# Patient Record
Sex: FEMALE | Race: BLACK OR AFRICAN AMERICAN | NOT HISPANIC OR LATINO | Employment: OTHER | URBAN - METROPOLITAN AREA
[De-identification: names, ages, dates, MRNs, and addresses within clinical notes are randomized per-mention and may not be internally consistent; named-entity substitution may affect disease eponyms.]

---

## 2023-11-08 ENCOUNTER — APPOINTMENT (OUTPATIENT)
Dept: OPHTHALMOLOGY | Facility: CLINIC | Age: 83
End: 2023-11-08
Payer: MEDICARE

## 2024-01-02 ENCOUNTER — OFFICE VISIT (OUTPATIENT)
Dept: OPHTHALMOLOGY | Facility: CLINIC | Age: 84
End: 2024-01-02
Payer: MEDICARE

## 2024-01-02 DIAGNOSIS — H04.123 DRY EYES: ICD-10-CM

## 2024-01-02 DIAGNOSIS — H40.1131 PRIMARY OPEN ANGLE GLAUCOMA (POAG) OF BOTH EYES, MILD STAGE: ICD-10-CM

## 2024-01-02 DIAGNOSIS — Z96.1 PSEUDOPHAKIA: ICD-10-CM

## 2024-01-02 DIAGNOSIS — H40.1131 PRIMARY OPEN ANGLE GLAUCOMA (POAG) OF BOTH EYES, MILD STAGE: Primary | ICD-10-CM

## 2024-01-02 PROCEDURE — 92012 INTRM OPH EXAM EST PATIENT: CPT | Performed by: OPHTHALMOLOGY

## 2024-01-02 RX ORDER — LATANOPROST 50 UG/ML
SOLUTION/ DROPS OPHTHALMIC
Qty: 5 ML | OUTPATIENT
Start: 2024-01-02

## 2024-01-02 RX ORDER — LATANOPROST 50 UG/ML
1 SOLUTION/ DROPS OPHTHALMIC NIGHTLY
Qty: 2.5 ML | Refills: 3 | Status: SHIPPED | OUTPATIENT
Start: 2024-01-02 | End: 2024-01-03

## 2024-01-02 RX ORDER — LATANOPROST 50 UG/ML
1 SOLUTION/ DROPS OPHTHALMIC NIGHTLY
Qty: 2.5 ML | Refills: 3 | Status: SHIPPED | OUTPATIENT
Start: 2024-01-02 | End: 2024-01-02 | Stop reason: SDUPTHER

## 2024-01-02 ASSESSMENT — EXTERNAL EXAM - LEFT EYE: OS_EXAM: NORMAL

## 2024-01-02 ASSESSMENT — TONOMETRY
IOP_METHOD: GOLDMANN APPLANATION
OS_IOP_MMHG: 13
OD_IOP_MMHG: 13

## 2024-01-02 ASSESSMENT — SLIT LAMP EXAM - LIDS
COMMENTS: GOOD POSITION
COMMENTS: GOOD POSITION

## 2024-01-02 ASSESSMENT — EXTERNAL EXAM - RIGHT EYE: OD_EXAM: NORMAL

## 2024-01-02 ASSESSMENT — PACHYMETRY
OD_CT(UM): 543
OS_CT(UM): 537

## 2024-01-02 ASSESSMENT — VISUAL ACUITY
OD_CC: 20/25
OS_CC: 20/20
METHOD: SNELLEN - LINEAR

## 2024-01-02 NOTE — PROGRESS NOTES
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, mild stage  -continue with Latanoprost both eyes at bedtime    Dry eyes  -Start artificial tears both eyes (OU) qid  -stress compliance  -start warm compresses both eyes bid    Pseudophakia  continue to monitor      Return in   3 month(s) for follow up or sooner if having any problems

## 2024-04-02 ENCOUNTER — OFFICE VISIT (OUTPATIENT)
Dept: OPHTHALMOLOGY | Facility: CLINIC | Age: 84
End: 2024-04-02
Payer: MEDICARE

## 2024-04-02 DIAGNOSIS — Z96.1 PSEUDOPHAKIA: ICD-10-CM

## 2024-04-02 DIAGNOSIS — H40.1131 PRIMARY OPEN ANGLE GLAUCOMA (POAG) OF BOTH EYES, MILD STAGE: Primary | ICD-10-CM

## 2024-04-02 DIAGNOSIS — H26.491 POSTERIOR CAPSULAR OPACIFICATION OF RIGHT EYE, NOT OBSCURING VISION: ICD-10-CM

## 2024-04-02 PROCEDURE — 92012 INTRM OPH EXAM EST PATIENT: CPT | Performed by: OPHTHALMOLOGY

## 2024-04-02 RX ORDER — LATANOPROST 50 UG/ML
SOLUTION/ DROPS OPHTHALMIC
Qty: 7.5 ML | Refills: 0 | Status: SHIPPED | OUTPATIENT
Start: 2024-04-02

## 2024-04-02 ASSESSMENT — TONOMETRY
IOP_METHOD: GOLDMANN APPLANATION
OS_IOP_MMHG: 13
OD_IOP_MMHG: 12

## 2024-04-02 ASSESSMENT — VISUAL ACUITY
OD_CC: 20/25-
OS_CC: 20/20
METHOD: SNELLEN - LINEAR

## 2024-04-02 ASSESSMENT — SLIT LAMP EXAM - LIDS
COMMENTS: GOOD POSITION
COMMENTS: GOOD POSITION

## 2024-04-02 ASSESSMENT — EXTERNAL EXAM - RIGHT EYE: OD_EXAM: NORMAL

## 2024-04-02 ASSESSMENT — EXTERNAL EXAM - LEFT EYE: OS_EXAM: NORMAL

## 2024-04-02 ASSESSMENT — PACHYMETRY
OD_CT(UM): 543
OS_CT(UM): 537

## 2024-04-02 NOTE — PROGRESS NOTES
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, mild stage  -     latanoprost (Xalatan) 0.005 % ophthalmic solution; INSTILL 1 DROP IN BOTH EYES EVERY DAY AT BEDTIME  Stable intraocular pressure (IOP)  -continue with Latanoprost both eyes at bedtime    Pseudophakia  Posterior capsular opacification of right eye, not obscuring vision    Return for a dilated exam in   3 months or sooner if having any problems

## 2024-07-09 ENCOUNTER — APPOINTMENT (OUTPATIENT)
Dept: OPHTHALMOLOGY | Facility: CLINIC | Age: 84
End: 2024-07-09
Payer: MEDICARE

## 2024-07-09 DIAGNOSIS — H43.399 VITREOUS OPACITIES: ICD-10-CM

## 2024-07-09 DIAGNOSIS — H26.491 POSTERIOR CAPSULAR OPACIFICATION OF RIGHT EYE, OBSCURING VISION: ICD-10-CM

## 2024-07-09 DIAGNOSIS — Z96.1 PSEUDOPHAKIA: ICD-10-CM

## 2024-07-09 DIAGNOSIS — H04.123 DRY EYES: ICD-10-CM

## 2024-07-09 DIAGNOSIS — H53.8 BLURRED VISION: ICD-10-CM

## 2024-07-09 DIAGNOSIS — H40.1131 PRIMARY OPEN ANGLE GLAUCOMA (POAG) OF BOTH EYES, MILD STAGE: Primary | ICD-10-CM

## 2024-07-09 PROCEDURE — 92014 COMPRE OPH EXAM EST PT 1/>: CPT | Performed by: OPHTHALMOLOGY

## 2024-07-09 RX ORDER — LATANOPROST 50 UG/ML
1 SOLUTION/ DROPS OPHTHALMIC NIGHTLY
Qty: 7.5 ML | Refills: 3 | Status: SHIPPED | OUTPATIENT
Start: 2024-07-09 | End: 2024-10-07

## 2024-07-09 ASSESSMENT — VISUAL ACUITY
METHOD: SNELLEN - LINEAR
OS_CC: 20/20
OD_CC: 20/30

## 2024-07-09 ASSESSMENT — TONOMETRY
OD_IOP_MMHG: 13
OS_IOP_MMHG: 12
IOP_METHOD: GOLDMANN APPLANATION

## 2024-07-09 ASSESSMENT — SLIT LAMP EXAM - LIDS
COMMENTS: GOOD POSITION
COMMENTS: GOOD POSITION

## 2024-07-09 ASSESSMENT — PACHYMETRY
OS_CT(UM): 537
OD_CT(UM): 543

## 2024-07-09 ASSESSMENT — CUP TO DISC RATIO
OS_RATIO: 0.55
OD_RATIO: 0.55

## 2024-07-09 ASSESSMENT — EXTERNAL EXAM - LEFT EYE: OS_EXAM: NORMAL

## 2024-07-09 ASSESSMENT — EXTERNAL EXAM - RIGHT EYE: OD_EXAM: NORMAL

## 2024-07-09 NOTE — PROGRESS NOTES
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, mild stage  -     latanoprost (Xalatan) 0.005 % ophthalmic solution; Administer 1 drop into both eyes once daily at bedtime. INSTILL 1 DROP IN BOTH EYES EVERY DAY AT BEDTIME  Stable intraocular pressure (IOP)  continue to monitor  -continue with Latanoprost both eyes at bedtime    Dry eyes  -Start artificial tears both eyes (OU) qid  -stress compliance    Pseudophakia  continue to monitor    Posterior capsular opacification of right eye, obscuring vision  Visually significant  Refer to Dr. Ambrosio for YAG capsulotomy    Blurred vision right ey e  -due to opacified post. Capsule     Vitreous opacities  continue to monitor    Return in  3 month(s) for follow up or sooner if having any problems  Refract at next visit

## 2024-09-17 ENCOUNTER — APPOINTMENT (OUTPATIENT)
Dept: OPHTHALMOLOGY | Facility: CLINIC | Age: 84
End: 2024-09-17
Payer: MEDICARE

## 2024-09-17 DIAGNOSIS — H26.491 PCO (POSTERIOR CAPSULAR OPACIFICATION), RIGHT: Primary | ICD-10-CM

## 2024-09-17 DIAGNOSIS — H52.203 ASTIGMATISM OF BOTH EYES, UNSPECIFIED TYPE: ICD-10-CM

## 2024-09-17 DIAGNOSIS — H52.12 MYOPIA OF LEFT EYE: ICD-10-CM

## 2024-09-17 DIAGNOSIS — H52.4 PRESBYOPIA: ICD-10-CM

## 2024-09-17 DIAGNOSIS — Z96.1 PSEUDOPHAKIA: ICD-10-CM

## 2024-09-17 DIAGNOSIS — H43.813 PVD (POSTERIOR VITREOUS DETACHMENT), BOTH EYES: ICD-10-CM

## 2024-09-17 DIAGNOSIS — H40.1131 PRIMARY OPEN ANGLE GLAUCOMA (POAG) OF BOTH EYES, MILD STAGE: ICD-10-CM

## 2024-09-17 DIAGNOSIS — H52.01 HYPEROPIA OF RIGHT EYE: ICD-10-CM

## 2024-09-17 PROCEDURE — 66821 AFTER CATARACT LASER SURGERY: CPT | Mod: RIGHT SIDE | Performed by: OPHTHALMOLOGY

## 2024-09-17 PROCEDURE — 99204 OFFICE O/P NEW MOD 45 MIN: CPT | Performed by: OPHTHALMOLOGY

## 2024-09-17 RX ORDER — IRBESARTAN 300 MG/1
1 TABLET ORAL
COMMUNITY
Start: 2024-07-20

## 2024-09-17 RX ORDER — ATENOLOL 50 MG/1
1 TABLET ORAL
COMMUNITY
Start: 2024-08-07

## 2024-09-17 RX ORDER — AMLODIPINE BESYLATE 10 MG/1
1 TABLET ORAL
COMMUNITY
Start: 2024-07-19

## 2024-09-17 RX ORDER — HYDROCHLOROTHIAZIDE 25 MG/1
1 TABLET ORAL
COMMUNITY
Start: 2024-07-19

## 2024-09-17 RX ORDER — HYDRALAZINE HYDROCHLORIDE 25 MG/1
1 TABLET, FILM COATED ORAL
COMMUNITY
Start: 2024-08-02

## 2024-09-17 ASSESSMENT — ENCOUNTER SYMPTOMS
RESPIRATORY NEGATIVE: 0
ALLERGIC/IMMUNOLOGIC NEGATIVE: 0
ENDOCRINE NEGATIVE: 0
CARDIOVASCULAR NEGATIVE: 0
NEUROLOGICAL NEGATIVE: 0
EYES NEGATIVE: 1
CONSTITUTIONAL NEGATIVE: 0
HEMATOLOGIC/LYMPHATIC NEGATIVE: 0
MUSCULOSKELETAL NEGATIVE: 0
GASTROINTESTINAL NEGATIVE: 0
PSYCHIATRIC NEGATIVE: 0

## 2024-09-17 ASSESSMENT — REFRACTION_MANIFEST
OD_SPHERE: +0.50
OD_AXIS: 090
OD_CYLINDER: -0.50
OS_ADD: +3.00
OS_SPHERE: -1.00
OS_AXIS: 090
OS_CYLINDER: -0.50
OD_ADD: +3.00

## 2024-09-17 ASSESSMENT — REFRACTION_WEARINGRX
OD_ADD: +3.00
OS_ADD: +3.00
OD_SPHERE: +0.50
OS_AXIS: 090
OD_CYLINDER: -0.50
OS_CYLINDER: -0.50
OD_AXIS: 090
OS_SPHERE: -1.00

## 2024-09-17 ASSESSMENT — EXTERNAL EXAM - LEFT EYE: OS_EXAM: NORMAL

## 2024-09-17 ASSESSMENT — VISUAL ACUITY
CORRECTION_TYPE: GLASSES
METHOD: SNELLEN - LINEAR
OS_BAT_MED: 20/40
OS_CC: 20/30
OD_CC: 20/70

## 2024-09-17 ASSESSMENT — SLIT LAMP EXAM - LIDS
COMMENTS: GOOD POSITION
COMMENTS: GOOD POSITION

## 2024-09-17 ASSESSMENT — CUP TO DISC RATIO
OS_RATIO: .35
OD_RATIO: .35

## 2024-09-17 ASSESSMENT — EXTERNAL EXAM - RIGHT EYE: OD_EXAM: NORMAL

## 2024-09-17 ASSESSMENT — TONOMETRY
OD_IOP_MMHG: 12
OS_IOP_MMHG: 13
IOP_METHOD: GOLDMANN APPLANATION

## 2024-09-17 ASSESSMENT — PACHYMETRY
OD_CT(UM): 543
OS_CT(UM): 537

## 2024-09-17 NOTE — PROGRESS NOTES
Posterior capsular opacification, right eye  Pseudophakia of right eyeZ96.1 - 10/17/19 - Complex with Trypan Blue  Pseudophakia of left eyeZ96.1 - 5/30/19 - Complex with Trypan Blue  -Visually significant PCO right eye. Symptoms: Gradual worsening over the past 6 months. Harder to read small print. More difficulty seeing small words/numbers on TV. Having more trouble seeing road signs when driving. Avoids driving at night. Discussed diagnosis with patient and option of YAG capsulotomy. Discussed procedure. Discussed potential risks, benefits, and alternatives, including but not limited to: pain, inflammation, edema, retinal tear/detachment, floaters, increased eye pressure, damage to other ocular structures, damage to/dislocation of lens implant, glare, need to repeat procedure, loss of vision or loss of eye. Patient understands and wishes to proceed. Consent signed.  YAG capsulotomy right eye done September 17, 2024. Advised to use artificial tears PRN. F/u 1-2 months - refract OU (autorefract first), dilate OD. D/w patient may need to repeat laser to enlarge opening in the future as needed. Call or return sooner if any redness, pain, decreased vision, flashes, or floaters.   Pulse = 39  TE = 31.2  Power = 0.8 mJ  Post-YAG IOP = 14  **Referred by Dr. Martinez    Primary open angle glaucoma (POAG) of both eyes, mild [id number]  -Managed by Dr. Martinez  -Continue latanoprost OU QHS  -Guarded visual prognosis if any vision/visual field loss from glaucoma    PVD (posterior vitreous detachment), both eyesH43.813  -No retinal tear or detachment seen. Monitor.    EqformptkZ91.00  Myopia  BgxzvgujshyG89.209  MlglqzepqtP49.4  -F/u 1-2 months - refract OU (autorefract first), dilate OD.

## 2024-10-15 ENCOUNTER — APPOINTMENT (OUTPATIENT)
Dept: OPHTHALMOLOGY | Facility: CLINIC | Age: 84
End: 2024-10-15
Payer: MEDICARE

## 2024-10-15 DIAGNOSIS — H40.1131 PRIMARY OPEN ANGLE GLAUCOMA (POAG) OF BOTH EYES, MILD STAGE: Primary | ICD-10-CM

## 2024-10-15 DIAGNOSIS — H52.13 MYOPIA, BILATERAL: ICD-10-CM

## 2024-10-15 DIAGNOSIS — Z96.1 PSEUDOPHAKIA: ICD-10-CM

## 2024-10-15 DIAGNOSIS — H52.4 PRESBYOPIA: ICD-10-CM

## 2024-10-15 DIAGNOSIS — H52.223 REGULAR ASTIGMATISM OF BOTH EYES: ICD-10-CM

## 2024-10-15 PROCEDURE — 92012 INTRM OPH EXAM EST PATIENT: CPT | Performed by: OPHTHALMOLOGY

## 2024-10-15 ASSESSMENT — TONOMETRY
IOP_METHOD: GOLDMANN APPLANATION
OS_IOP_MMHG: 14
OD_IOP_MMHG: 13

## 2024-10-15 ASSESSMENT — REFRACTION_WEARINGRX
OD_SPHERE: -0.50
OS_AXIS: 090
OS_ADD: +3.00
OD_ADD: +3.00
OS_SPHERE: -1.00
OS_CYLINDER: -0.50
OD_CYLINDER: -0.50
OD_AXIS: 090

## 2024-10-15 ASSESSMENT — REFRACTION_MANIFEST
OD_AXIS: 090
OS_ADD: +3.00
OD_CYLINDER: -0.50
OD_ADD: +3.00
OS_CYLINDER: -0.75
OS_AXIS: 090
OD_SPHERE: -0.25
OS_SPHERE: -0.25

## 2024-10-15 ASSESSMENT — SLIT LAMP EXAM - LIDS
COMMENTS: GOOD POSITION
COMMENTS: GOOD POSITION

## 2024-10-15 ASSESSMENT — PACHYMETRY
OD_CT(UM): 543
OS_CT(UM): 537

## 2024-10-15 ASSESSMENT — EXTERNAL EXAM - RIGHT EYE: OD_EXAM: NORMAL

## 2024-10-15 ASSESSMENT — VISUAL ACUITY
METHOD: SNELLEN - LINEAR
OD_CC: 20/20-1
OS_CC: 20/25

## 2024-10-15 ASSESSMENT — ENCOUNTER SYMPTOMS: EYES NEGATIVE: 1

## 2024-10-15 ASSESSMENT — EXTERNAL EXAM - LEFT EYE: OS_EXAM: NORMAL

## 2024-10-15 NOTE — PROGRESS NOTES
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, mild stage  -continue with Latanoprost both eyes qhs    Pseudophakia  continue to monitor  -status post (s/p) YAG capsulotomy right eye  continue to monitor    Myopia, bilateral  Regular astigmatism of both eyes  Presbyopia  -pt wants to hold off on new glasses right now    Return for a dilated exam in   3 months or sooner if having any problems  Visual field (VF) and OCT at next visit  Refract at next visit

## 2024-11-17 ASSESSMENT — EXTERNAL EXAM - LEFT EYE: OS_EXAM: NORMAL

## 2024-11-17 ASSESSMENT — SLIT LAMP EXAM - LIDS
COMMENTS: GOOD POSITION
COMMENTS: GOOD POSITION

## 2024-11-17 ASSESSMENT — CUP TO DISC RATIO: OD_RATIO: .35

## 2024-11-17 ASSESSMENT — EXTERNAL EXAM - RIGHT EYE: OD_EXAM: NORMAL

## 2024-11-17 NOTE — PROGRESS NOTES
History of YAG laser capsulotomy, right eye  Posterior capsular opacification, right eye  Pseudophakia of right eyeZ96.1 - 10/17/19 - Complex with Trypan Blue  Pseudophakia of left eyeZ96.1 - 5/30/19 - Complex with Trypan Blue  -History of YAG laser capsulotomy, right eye - 9/17/24 - vision improved.  -No retinal tear or detachment seen on exam. Retinal detachment symptoms discussed.   **Referred by Dr. Martinez    Primary open angle glaucoma (POAG) of both eyes, mild [id number]  -Managed by Dr. Martinez  -Continue latanoprost OU QHS  -Guarded visual prognosis if any vision/visual field loss from glaucoma    PVD (posterior vitreous detachment), both eyesH43.813  -No retinal tear or detachment seen. Monitor.    HekuozlxqV11.00  Myopia  SbiivvsomehK64.209  GpedgzpgivN53.4  -Continue current glasses for now  -Refraction performed today for diagnostic purposes only and without specific intent to dispense Rx. Glasses Rx not dispensed today.         Attending Attestation Statement for Evaluation and Management Services:    I was physically present and/or personally examined the patient during the evaluation of this patient. I reviewed the documentation and confirm the resident's note.

## 2024-11-18 ENCOUNTER — APPOINTMENT (OUTPATIENT)
Dept: OPHTHALMOLOGY | Facility: CLINIC | Age: 84
End: 2024-11-18
Payer: MEDICARE

## 2024-11-18 DIAGNOSIS — H43.813 PVD (POSTERIOR VITREOUS DETACHMENT), BOTH EYES: ICD-10-CM

## 2024-11-18 DIAGNOSIS — Z96.1 PSEUDOPHAKIA: ICD-10-CM

## 2024-11-18 DIAGNOSIS — H52.01 HYPEROPIA OF RIGHT EYE: ICD-10-CM

## 2024-11-18 DIAGNOSIS — Z98.41 HISTORY OF YAG LASER CAPSULOTOMY OF LENS OF RIGHT EYE: Primary | ICD-10-CM

## 2024-11-18 DIAGNOSIS — H40.1131 PRIMARY OPEN ANGLE GLAUCOMA (POAG) OF BOTH EYES, MILD STAGE: ICD-10-CM

## 2024-11-18 DIAGNOSIS — H52.12 MYOPIA OF LEFT EYE: ICD-10-CM

## 2024-11-18 DIAGNOSIS — H26.491 PCO (POSTERIOR CAPSULAR OPACIFICATION), RIGHT: ICD-10-CM

## 2024-11-18 DIAGNOSIS — H52.203 ASTIGMATISM OF BOTH EYES, UNSPECIFIED TYPE: ICD-10-CM

## 2024-11-18 PROCEDURE — 99024 POSTOP FOLLOW-UP VISIT: CPT | Performed by: OPHTHALMOLOGY

## 2024-11-18 RX ORDER — LATANOPROST 50 UG/ML
SOLUTION/ DROPS OPHTHALMIC
COMMUNITY
Start: 2024-10-02

## 2024-11-18 ASSESSMENT — REFRACTION_WEARINGRX
OS_SPHERE: -1.00
OS_AXIS: 090
OD_CYLINDER: -0.50
OD_ADD: +3.00
OD_AXIS: 090
OS_CYLINDER: -0.50
OD_SPHERE: +0.50
OS_ADD: +3.00

## 2024-11-18 ASSESSMENT — REFRACTION_MANIFEST
OS_SPHERE: -0.50
OD_SPHERE: +0.50
OS_ADD: +3.00
OD_AXIS: 090
OD_AXIS: 090
OD_CYLINDER: -1.00
OD_ADD: +3.00
OS_AXIS: 090
OD_SPHERE: +0.75
METHOD_AUTOREFRACTION: 1
OS_SPHERE: +0.25
OS_AXIS: 080
OS_CYLINDER: -0.75
OD_CYLINDER: -1.00
OS_CYLINDER: -0.50

## 2024-11-18 ASSESSMENT — TONOMETRY
IOP_METHOD: GOLDMANN APPLANATION
OD_IOP_MMHG: 10
OS_IOP_MMHG: 10

## 2024-11-18 ASSESSMENT — CUP TO DISC RATIO: OS_RATIO: .

## 2024-11-18 ASSESSMENT — ENCOUNTER SYMPTOMS
HEMATOLOGIC/LYMPHATIC NEGATIVE: 0
MUSCULOSKELETAL NEGATIVE: 0
NEUROLOGICAL NEGATIVE: 0
GASTROINTESTINAL NEGATIVE: 0
CARDIOVASCULAR NEGATIVE: 0
EYES NEGATIVE: 1
ENDOCRINE NEGATIVE: 0
RESPIRATORY NEGATIVE: 0
ALLERGIC/IMMUNOLOGIC NEGATIVE: 0
CONSTITUTIONAL NEGATIVE: 0
PSYCHIATRIC NEGATIVE: 0

## 2024-11-18 ASSESSMENT — PACHYMETRY
OS_CT(UM): 537
OD_CT(UM): 543

## 2024-11-18 ASSESSMENT — VISUAL ACUITY
OS_CC: 20/25
METHOD: SNELLEN - LINEAR
OD_CC: 20/25+

## 2025-01-16 ENCOUNTER — APPOINTMENT (OUTPATIENT)
Dept: OPHTHALMOLOGY | Facility: CLINIC | Age: 85
End: 2025-01-16
Payer: MEDICARE

## 2025-01-16 DIAGNOSIS — H52.12 MYOPIA OF LEFT EYE: ICD-10-CM

## 2025-01-16 DIAGNOSIS — H40.1131 PRIMARY OPEN ANGLE GLAUCOMA (POAG) OF BOTH EYES, MILD STAGE: Primary | ICD-10-CM

## 2025-01-16 DIAGNOSIS — H26.491 PCO (POSTERIOR CAPSULAR OPACIFICATION), RIGHT: ICD-10-CM

## 2025-01-16 DIAGNOSIS — Z98.41 HISTORY OF YAG LASER CAPSULOTOMY OF LENS OF RIGHT EYE: ICD-10-CM

## 2025-01-16 DIAGNOSIS — H52.4 PRESBYOPIA: ICD-10-CM

## 2025-01-16 DIAGNOSIS — H52.203 ASTIGMATISM OF BOTH EYES, UNSPECIFIED TYPE: ICD-10-CM

## 2025-01-16 DIAGNOSIS — Z96.1 PSEUDOPHAKIA: ICD-10-CM

## 2025-01-16 DIAGNOSIS — H52.01 HYPEROPIA OF RIGHT EYE: ICD-10-CM

## 2025-01-16 LAB
AVERAGE RNFL TODAY (OD): 99
AVERAGE RNFL TODAY (OS): 98
C/D RATIO TODAY (OD): 0.59
C/D RATIO TODAY (OS): 0.59

## 2025-01-16 PROCEDURE — 92133 CPTRZD OPH DX IMG PST SGM ON: CPT | Performed by: OPHTHALMOLOGY

## 2025-01-16 PROCEDURE — 92015 DETERMINE REFRACTIVE STATE: CPT | Performed by: OPHTHALMOLOGY

## 2025-01-16 PROCEDURE — 92083 EXTENDED VISUAL FIELD XM: CPT | Performed by: OPHTHALMOLOGY

## 2025-01-16 PROCEDURE — 92014 COMPRE OPH EXAM EST PT 1/>: CPT | Performed by: OPHTHALMOLOGY

## 2025-01-16 ASSESSMENT — REFRACTION_WEARINGRX
OD_ADD: +3.00
OS_ADD: +3.00
OD_AXIS: 090
OD_SPHERE: -0.50
OS_CYLINDER: -0.50
OS_SPHERE: -1.00
OD_CYLINDER: -0.50
OS_AXIS: 090

## 2025-01-16 ASSESSMENT — REFRACTION_MANIFEST
OS_SPHERE: -0.75
OD_SPHERE: +0.25
OS_CYLINDER: -0.50
OS_ADD: +3.00
OD_CYLINDER: -1.00
OD_ADD: +3.00
OD_AXIS: 090
OS_AXIS: 090

## 2025-01-16 ASSESSMENT — VISUAL ACUITY
OD_CC: 20/20
METHOD: SNELLEN - LINEAR
OS_CC: 20/20-1

## 2025-01-16 ASSESSMENT — PACHYMETRY
OS_CT(UM): 537
OD_CT(UM): 543

## 2025-01-16 ASSESSMENT — CUP TO DISC RATIO
OD_RATIO: 0.5
OS_RATIO: 0.5

## 2025-01-16 ASSESSMENT — EXTERNAL EXAM - RIGHT EYE: OD_EXAM: NORMAL

## 2025-01-16 ASSESSMENT — SLIT LAMP EXAM - LIDS
COMMENTS: GOOD POSITION
COMMENTS: GOOD POSITION

## 2025-01-16 ASSESSMENT — EXTERNAL EXAM - LEFT EYE: OS_EXAM: NORMAL

## 2025-01-16 ASSESSMENT — ENCOUNTER SYMPTOMS: EYES NEGATIVE: 1

## 2025-01-16 ASSESSMENT — TONOMETRY
OS_IOP_MMHG: 13
OD_IOP_MMHG: 12
IOP_METHOD: GOLDMANN APPLANATION

## 2025-01-16 NOTE — PROGRESS NOTES
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, mild stage  -     OCT, Optic Nerve - OU - Both Eyes  -     Neri Visual Field - OU - Both Eyes  Stable intraocular pressure (IOP)  -continue with Latanoprost both eyes qhs    History of YAG laser capsulotomy of lens of right eye  -good vision after capsulotomy- pt is happy  continue to monitor    PCO (posterior capsular opacification), right  Not visually significant at the present time  continue to monitor    Pseudophakia  Hyperopia of right eye  Myopia of left eye  Astigmatism of both eyes, unspecified type  Presbyopia  Refractive error  -give Rx for new glasses    Return in  3  month(s) for follow up or sooner if having any problems

## 2025-04-17 ENCOUNTER — APPOINTMENT (OUTPATIENT)
Dept: OPHTHALMOLOGY | Facility: CLINIC | Age: 85
End: 2025-04-17
Payer: MEDICARE

## 2025-04-17 DIAGNOSIS — Z98.41 HISTORY OF YAG LASER CAPSULOTOMY OF LENS OF RIGHT EYE: ICD-10-CM

## 2025-04-17 DIAGNOSIS — Z96.1 PSEUDOPHAKIA: ICD-10-CM

## 2025-04-17 DIAGNOSIS — H40.1131 PRIMARY OPEN ANGLE GLAUCOMA (POAG) OF BOTH EYES, MILD STAGE: Primary | ICD-10-CM

## 2025-04-17 PROCEDURE — 92012 INTRM OPH EXAM EST PATIENT: CPT | Performed by: OPHTHALMOLOGY

## 2025-04-17 RX ORDER — LATANOPROST 50 UG/ML
1 SOLUTION/ DROPS OPHTHALMIC NIGHTLY
Qty: 2.5 ML | Refills: 3 | Status: SHIPPED | OUTPATIENT
Start: 2025-04-17

## 2025-04-17 ASSESSMENT — SLIT LAMP EXAM - LIDS
COMMENTS: GOOD POSITION
COMMENTS: GOOD POSITION

## 2025-04-17 ASSESSMENT — VISUAL ACUITY
OD_CC: 20/20-1
METHOD: SNELLEN - LINEAR
OS_CC: 20/20

## 2025-04-17 ASSESSMENT — TONOMETRY
OS_IOP_MMHG: 13
OD_IOP_MMHG: 10
IOP_METHOD: GOLDMANN APPLANATION

## 2025-04-17 ASSESSMENT — EXTERNAL EXAM - LEFT EYE: OS_EXAM: NORMAL

## 2025-04-17 ASSESSMENT — PACHYMETRY
OD_CT(UM): 543
OS_CT(UM): 537

## 2025-04-17 ASSESSMENT — EXTERNAL EXAM - RIGHT EYE: OD_EXAM: NORMAL

## 2025-04-17 ASSESSMENT — ENCOUNTER SYMPTOMS: EYES NEGATIVE: 1

## 2025-04-17 NOTE — PROGRESS NOTES
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

## 2025-07-17 ENCOUNTER — APPOINTMENT (OUTPATIENT)
Dept: OPHTHALMOLOGY | Facility: CLINIC | Age: 85
End: 2025-07-17
Payer: MEDICARE

## 2025-10-07 ENCOUNTER — APPOINTMENT (OUTPATIENT)
Dept: OPHTHALMOLOGY | Facility: CLINIC | Age: 85
End: 2025-10-07
Payer: MEDICARE